# Patient Record
(demographics unavailable — no encounter records)

---

## 2024-10-30 NOTE — HISTORY OF PRESENT ILLNESS
[de-identified] : 81M here in followup.  He c/o excessive throat clearing and feeling of lots of phlegm in his throat. This started after getting bad URI 3-4 weeks ago. The URI improved, but above sx persist. There is no hoarseness nor coughing. He had seen urgent care and is taking flonase and zyrtec, but sx persist.  There is no difficulty eating, breathing, swallowing or talking. His diet is better for reflux control.  I have seen him many times in the past for a variety of similar reasons including coughing, phlegm, throat clearing, postnasal drip and occasional hoarseness, usually after getting over URI. He usually goes on nasal steroids, PPI/H2B until sx resolve; sometimes has required inhaled corticosteroids or systemic steroids too.  ROS otherwise unremarkable.

## 2024-10-30 NOTE — CONSULT LETTER
[Dear  ___] : Dear  [unfilled], [Courtesy Letter:] : I had the pleasure of seeing your patient, [unfilled], in my office today. [Consult Closing:] : Thank you very much for allowing me to participate in the care of this patient.  If you have any questions, please do not hesitate to contact me. [Sincerely,] : Sincerely, [Jens Field MD] : Jens Field MD  [Department of Otolaryngology, Head and Neck Surgery] : Department of Otolaryngology, Head and Neck Surgery [[facility]] : [facility]

## 2024-10-30 NOTE — PHYSICAL EXAM
[FreeTextEntry1] : occasional shallow dry cough [] : septum deviated to the right [de-identified] : mildly hypertrophic inferior turbinates with scant mucus; mildly dry right caudal septal mucosa [Midline] : trachea located in midline position [de-identified] : small right lateral oral tongue fibroma, stable [de-identified] : posterior opx clear w no cobblestoning or drainage [Normal] : no rashes

## 2024-10-30 NOTE — PROCEDURE
[FreeTextEntry3] : Nasal Endoscopy: nasal airways patent middle meati patent, no mucopus or polyps no nasopharyngeal erythema; choana clear, no drainage  Fiberoptic Laryngoscopy: upper airway widely patent TVF mobile and symmetric no masses/lesions mild mucus

## 2024-10-30 NOTE — ASSESSMENT
[FreeTextEntry1] : 81M here in followup. He c/o excessive throat clearing and feeling of lots of phlegm in his throat. This started after getting bad URI 3-4 weeks ago. The URI improved, but above sx persist. There is no hoarseness nor coughing. He had seen urgent care and is taking flonase and zyrtec, but sx persist. I have seen him many times in the past for a variety of similar reasons including coughing, phlegm, throat clearing, postnasal drip and occasional hoarseness, usually after getting over URI. He usually goes on nasal steroids, PPI/H2B until sx resolve; sometimes has required inhaled corticosteroids or systemic steroids too. Complete and comprehensive head and neck exam, including nasal endoscopy and fiberoptic laryngoscopy, is grossly unremarkable. Sx most likely reactive/inflammatory and postviral made worse by underlying reflux. This is a repeating pattern for him. At this point, stop zyrtec. Will start PPI/H2B daily for 4 weeks w strict diet/lifestyle changes. Then, so long as sx controlled, will taper off.